# Patient Record
Sex: MALE | Race: WHITE | URBAN - METROPOLITAN AREA
[De-identification: names, ages, dates, MRNs, and addresses within clinical notes are randomized per-mention and may not be internally consistent; named-entity substitution may affect disease eponyms.]

---

## 2019-12-16 ENCOUNTER — NEW PATIENT (OUTPATIENT)
Dept: URBAN - METROPOLITAN AREA CLINIC 26 | Facility: CLINIC | Age: 69
End: 2019-12-16
Payer: MEDICARE

## 2019-12-16 PROCEDURE — 92134 CPTRZ OPH DX IMG PST SGM RTA: CPT | Performed by: OPTOMETRIST

## 2019-12-16 PROCEDURE — 92004 COMPRE OPH EXAM NEW PT 1/>: CPT | Performed by: OPTOMETRIST

## 2019-12-16 ASSESSMENT — KERATOMETRY
OD: 43.13
OS: 43.38

## 2019-12-16 ASSESSMENT — INTRAOCULAR PRESSURE
OS: 18
OD: 16

## 2019-12-16 ASSESSMENT — VISUAL ACUITY
OS: 20/25
OD: 20/30

## 2019-12-19 ENCOUNTER — Encounter (OUTPATIENT)
Dept: URBAN - METROPOLITAN AREA CLINIC 24 | Facility: CLINIC | Age: 69
End: 2019-12-19
Payer: MEDICARE

## 2019-12-19 DIAGNOSIS — Z01.818 ENCOUNTER FOR OTHER PREPROCEDURAL EXAMINATION: Primary | ICD-10-CM

## 2019-12-19 PROCEDURE — 92025 CPTRIZED CORNEAL TOPOGRAPHY: CPT | Performed by: OPHTHALMOLOGY

## 2019-12-19 PROCEDURE — 99213 OFFICE O/P EST LOW 20 MIN: CPT | Performed by: PHYSICIAN ASSISTANT

## 2019-12-23 ENCOUNTER — FOLLOW UP ESTABLISHED (OUTPATIENT)
Dept: URBAN - METROPOLITAN AREA CLINIC 24 | Facility: CLINIC | Age: 69
End: 2019-12-23
Payer: MEDICARE

## 2019-12-23 DIAGNOSIS — H52.223 REGULAR ASTIGMATISM, BILATERAL: ICD-10-CM

## 2019-12-23 DIAGNOSIS — H25.813 COMBINED FORMS OF AGE-RELATED CATARACT, BILATERAL: Primary | ICD-10-CM

## 2019-12-23 PROCEDURE — 76519 ECHO EXAM OF EYE: CPT | Performed by: OPHTHALMOLOGY

## 2019-12-23 PROCEDURE — 92002 INTRM OPH EXAM NEW PATIENT: CPT | Performed by: OPHTHALMOLOGY

## 2019-12-28 ENCOUNTER — SURGERY (OUTPATIENT)
Dept: URBAN - METROPOLITAN AREA SURGERY 12 | Facility: SURGERY | Age: 69
End: 2019-12-28
Payer: MEDICARE

## 2019-12-28 PROCEDURE — 66984 XCAPSL CTRC RMVL W/O ECP: CPT | Performed by: OPHTHALMOLOGY

## 2019-12-29 ENCOUNTER — POST OP (OUTPATIENT)
Dept: URBAN - METROPOLITAN AREA CLINIC 24 | Facility: CLINIC | Age: 69
End: 2019-12-29

## 2019-12-29 PROCEDURE — 99024 POSTOP FOLLOW-UP VISIT: CPT | Performed by: OPTOMETRIST

## 2019-12-29 ASSESSMENT — INTRAOCULAR PRESSURE: OD: 28

## 2020-01-06 ENCOUNTER — POST OP (OUTPATIENT)
Dept: URBAN - METROPOLITAN AREA CLINIC 26 | Facility: CLINIC | Age: 70
End: 2020-01-06

## 2020-01-06 PROCEDURE — 99024 POSTOP FOLLOW-UP VISIT: CPT | Performed by: OPTOMETRIST

## 2020-01-06 ASSESSMENT — INTRAOCULAR PRESSURE
OS: 14
OD: 13

## 2020-01-06 ASSESSMENT — VISUAL ACUITY
OS: 20/25
OD: 20/25

## 2020-01-13 ENCOUNTER — SURGERY (OUTPATIENT)
Dept: URBAN - METROPOLITAN AREA SURGERY 12 | Facility: SURGERY | Age: 70
End: 2020-01-13
Payer: MEDICARE

## 2020-01-14 ENCOUNTER — POST OP (OUTPATIENT)
Dept: URBAN - METROPOLITAN AREA CLINIC 26 | Facility: CLINIC | Age: 70
End: 2020-01-14

## 2020-01-14 PROCEDURE — 99024 POSTOP FOLLOW-UP VISIT: CPT | Performed by: OPTOMETRIST

## 2020-01-14 ASSESSMENT — INTRAOCULAR PRESSURE: OS: 20

## 2020-02-04 ENCOUNTER — POST OP (OUTPATIENT)
Dept: URBAN - METROPOLITAN AREA CLINIC 26 | Facility: CLINIC | Age: 70
End: 2020-02-04

## 2020-02-04 DIAGNOSIS — Z96.1 PRESENCE OF INTRAOCULAR LENS: Primary | ICD-10-CM

## 2020-02-04 PROCEDURE — 99024 POSTOP FOLLOW-UP VISIT: CPT | Performed by: OPTOMETRIST

## 2020-02-04 RX ORDER — PREDNISOLONE ACETATE 10 MG/ML
1 % SUSPENSION/ DROPS OPHTHALMIC
Qty: 1 | Refills: 1 | Status: INACTIVE
Start: 2020-02-04 | End: 2020-08-03

## 2020-02-04 ASSESSMENT — INTRAOCULAR PRESSURE
OS: 17
OD: 19

## 2020-02-04 ASSESSMENT — VISUAL ACUITY
OS: 20/20
OD: 20/20

## 2020-08-03 ENCOUNTER — FOLLOW UP ESTABLISHED (OUTPATIENT)
Dept: URBAN - METROPOLITAN AREA CLINIC 26 | Facility: CLINIC | Age: 70
End: 2020-08-03
Payer: MEDICARE

## 2020-08-03 DIAGNOSIS — H04.123 TEAR FILM INSUFFICIENCY OF BILATERAL LACRIMAL GLANDS: Primary | ICD-10-CM

## 2020-08-03 PROCEDURE — 92014 COMPRE OPH EXAM EST PT 1/>: CPT | Performed by: OPTOMETRIST

## 2020-08-03 ASSESSMENT — VISUAL ACUITY
OS: 20/20
OD: 20/20

## 2020-08-03 ASSESSMENT — INTRAOCULAR PRESSURE
OD: 14
OS: 14

## 2020-08-03 ASSESSMENT — KERATOMETRY
OS: 43.50
OD: 42.63

## 2021-08-03 ENCOUNTER — OFFICE VISIT (OUTPATIENT)
Dept: URBAN - METROPOLITAN AREA CLINIC 26 | Facility: CLINIC | Age: 71
End: 2021-08-03
Payer: MEDICARE

## 2021-08-03 DIAGNOSIS — H43.393 OTHER VITREOUS OPACITIES, BILATERAL: ICD-10-CM

## 2021-08-03 PROCEDURE — 92014 COMPRE OPH EXAM EST PT 1/>: CPT | Performed by: OPTOMETRIST

## 2021-08-03 PROCEDURE — 92134 CPTRZ OPH DX IMG PST SGM RTA: CPT | Performed by: OPTOMETRIST

## 2021-08-03 ASSESSMENT — VISUAL ACUITY
OD: 20/20
OS: 20/20

## 2021-08-03 ASSESSMENT — KERATOMETRY
OD: 42.50
OS: 43.38

## 2021-08-03 ASSESSMENT — INTRAOCULAR PRESSURE
OD: 17
OS: 12

## 2021-08-03 NOTE — IMPRESSION/PLAN
Impression: Tear film insufficiency of bilateral lacrimal glands Plan: increase at's 4-6x/day OU. avoid overhead fans at bedtime. start otc omega iii fatty acids.